# Patient Record
Sex: MALE | HISPANIC OR LATINO | URBAN - METROPOLITAN AREA
[De-identification: names, ages, dates, MRNs, and addresses within clinical notes are randomized per-mention and may not be internally consistent; named-entity substitution may affect disease eponyms.]

---

## 2023-09-05 ENCOUNTER — OFFICE VISIT (OUTPATIENT)
Dept: URGENT CARE | Facility: CLINIC | Age: 18
End: 2023-09-05
Payer: COMMERCIAL

## 2023-09-05 VITALS
BODY MASS INDEX: 19.26 KG/M2 | TEMPERATURE: 98.6 F | OXYGEN SATURATION: 97 % | HEART RATE: 68 BPM | HEIGHT: 69 IN | DIASTOLIC BLOOD PRESSURE: 84 MMHG | WEIGHT: 130 LBS | RESPIRATION RATE: 16 BRPM | SYSTOLIC BLOOD PRESSURE: 122 MMHG

## 2023-09-05 DIAGNOSIS — R21 RASH OF FACE: ICD-10-CM

## 2023-09-05 DIAGNOSIS — L01.00 IMPETIGO: ICD-10-CM

## 2023-09-05 PROCEDURE — 3079F DIAST BP 80-89 MM HG: CPT | Performed by: PHYSICIAN ASSISTANT

## 2023-09-05 PROCEDURE — 99203 OFFICE O/P NEW LOW 30 MIN: CPT | Performed by: PHYSICIAN ASSISTANT

## 2023-09-05 PROCEDURE — 3074F SYST BP LT 130 MM HG: CPT | Performed by: PHYSICIAN ASSISTANT

## 2023-09-05 RX ORDER — CLOTRIMAZOLE 1 %
1 CREAM (GRAM) TOPICAL 2 TIMES DAILY
Qty: 28 G | Refills: 0 | Status: SHIPPED
Start: 2023-09-05 | End: 2023-09-05

## 2023-09-05 RX ORDER — CLOTRIMAZOLE 1 %
1 CREAM (GRAM) TOPICAL 2 TIMES DAILY
Qty: 28 G | Refills: 0 | Status: SHIPPED | OUTPATIENT
Start: 2023-09-05

## 2023-09-05 ASSESSMENT — ENCOUNTER SYMPTOMS
EYES NEGATIVE: 1
CONSTITUTIONAL NEGATIVE: 1

## 2023-09-05 NOTE — LETTER
[facility] URGENT [facility]ARE  [address]  [place] [address]     September 5, 2023    Patient: Ezio Ghotra   YOB: 2005   Date of Visit: 9/5/2023       To Whom It May Concern:    Ezio Ghotra was seen and treated in our department on 9/5/2023.  He will need to remain off work for the next 72 hours due to impetigo infection    Sincerely,     Forest Mares P.A.-C.

## 2023-09-06 ENCOUNTER — TELEPHONE (OUTPATIENT)
Dept: URGENT CARE | Facility: CLINIC | Age: 18
End: 2023-09-06
Payer: COMMERCIAL

## 2023-09-06 NOTE — PROGRESS NOTES
"  Subjective:     Ezio Ghotra  is a 17 y.o. male who presents for Other (Woke up with red spots on face, burning sensation )       He presents today with new onset rash on his face.  He has a rash present in the left nare, right lip as well as multiple rash marks on the right side of the face.  Symptoms have been ongoing over the last few days.  All areas of rash have been oozing a yellow fluid and have honey crusted scabbing over top.  All areas of rash do burn as well.  Denies any recent lifestyle changes.  No new lotions ointments or creams.  No recent travel.  No exposure to any known allergy contacts/contaminants.  Has been using hydrocortisone 1% without any symptom relief.  No fevers, no sinus congestion, no sore throat, no lesions in the mouth.  No ear pain.  No eye drainage or discharge.       Review of Systems   Constitutional: Negative.    HENT: Negative.     Eyes: Negative.    Skin:  Positive for rash.      No Known Allergies  History reviewed. No pertinent past medical history.     Objective:   /84   Pulse 68   Temp 37 °C (98.6 °F) (Temporal)   Resp 16   Ht 1.753 m (5' 9\")   Wt 59 kg (130 lb)   SpO2 97%   BMI 19.20 kg/m²   Physical Exam  Vitals and nursing note reviewed.   Constitutional:       General: He is not in acute distress.     Appearance: He is not ill-appearing or toxic-appearing.   HENT:      Head: Normocephalic.        Comments: Spots of rash present over the above marked regions.  Lesions are circular in nature with lighter colored skin in the middle of the rash with a darker colored border extending away from the middle of the rash.  The lesions on the right side of the lip and in the left nare do have honey crusted appearance at this time.  No active bleeding or drainage from any of the areas     Nose: No rhinorrhea.   Eyes:      General: No scleral icterus.     Conjunctiva/sclera: Conjunctivae normal.   Pulmonary:      Effort: Pulmonary effort is normal. No " respiratory distress.      Breath sounds: No stridor.   Musculoskeletal:      Cervical back: Neck supple.   Neurological:      Mental Status: He is alert and oriented to person, place, and time.   Psychiatric:         Mood and Affect: Mood normal.         Behavior: Behavior normal.         Thought Content: Thought content normal.         Judgment: Judgment normal.             Diagnostic testing: None    Assessment/Plan:     Encounter Diagnoses   Name Primary?    Impetigo     Rash of face           Plan for care for today's complaint includes starting the patient initially on mupirocin and hydrocortisone 2.5% for the rash of the face.  I do have high suspicion of impetigo; however, due to the appearance of the lesions on the right side of the face having a lighter appearance with a darker toned border extending from the center of the lesion it does raise suspicion of a possible fungal infection.  We will have the patient start with the mupirocin and hydrocortisone over the next 4-5 days and if symptoms are not improving at that time then we will have him switch to clotrimazole.  We discussed with the patient that if it is impetigo he is contagious for 72 hours after starting the antibiotic ointment.  Did discuss appropriate hygiene techniques to prevent coinfection or reinfection.  Work note provided continue to monitor symptoms and return to urgent care or follow-up with primary care provider if symptoms remain ongoing.  Follow-up in the emergency department if symptoms become severe, ER precautions discussed in office today..  Prescription for mupirocin, hydrocortisone 2.5%, clotrimazole provided.    See AVS Instructions below for written guidance provided to patient on after-visit management and care in addition to our verbal discussion during the visit.    Please note that this dictation was created using voice recognition software. I have attempted to correct all errors, but there may be sound-alike, spelling,  grammar and possibly content errors that I did not discover before finalizing the note.    Schley Suzan OTTO

## 2023-09-07 NOTE — TELEPHONE ENCOUNTER
Patient had questions in regards to prescriptions prescribed yesterday.    Per note:  Patient start with the mupirocin and hydrocortisone over the next 4-5 days and if symptoms are not improving at that time then we will have him switch to clotrimazole.    Discussed instructions as above with patient.    Advised to return to urgent care or nearest emergency department if symptoms fail to improve, for any change in condition, further concerns, or new concerning symptoms.

## 2023-09-11 ENCOUNTER — TELEPHONE (OUTPATIENT)
Dept: URGENT CARE | Facility: CLINIC | Age: 18
End: 2023-09-11
Payer: COMMERCIAL

## 2023-09-12 DIAGNOSIS — R21 RASH OF FACE: ICD-10-CM

## 2023-09-12 NOTE — TELEPHONE ENCOUNTER
Hi Ulysses,  Patient called today asking to speak to you, he has questions and needs clarification regarding the Rx he is taking.    [phone]

## 2024-11-07 ENCOUNTER — HOSPITAL ENCOUNTER (EMERGENCY)
Facility: MEDICAL CENTER | Age: 19
End: 2024-11-07
Attending: EMERGENCY MEDICINE
Payer: COMMERCIAL

## 2024-11-07 ENCOUNTER — PHARMACY VISIT (OUTPATIENT)
Dept: PHARMACY | Facility: MEDICAL CENTER | Age: 19
End: 2024-11-07
Payer: COMMERCIAL

## 2024-11-07 VITALS
SYSTOLIC BLOOD PRESSURE: 112 MMHG | HEIGHT: 70 IN | RESPIRATION RATE: 17 BRPM | WEIGHT: 136.02 LBS | OXYGEN SATURATION: 95 % | BODY MASS INDEX: 19.47 KG/M2 | HEART RATE: 71 BPM | DIASTOLIC BLOOD PRESSURE: 57 MMHG | TEMPERATURE: 98.9 F

## 2024-11-07 DIAGNOSIS — M62.838 MUSCLE SPASM: ICD-10-CM

## 2024-11-07 DIAGNOSIS — S39.92XA INJURY OF BACK, INITIAL ENCOUNTER: ICD-10-CM

## 2024-11-07 PROCEDURE — 700102 HCHG RX REV CODE 250 W/ 637 OVERRIDE(OP): Performed by: EMERGENCY MEDICINE

## 2024-11-07 PROCEDURE — RXMED WILLOW AMBULATORY MEDICATION CHARGE: Performed by: EMERGENCY MEDICINE

## 2024-11-07 PROCEDURE — 96372 THER/PROPH/DIAG INJ SC/IM: CPT

## 2024-11-07 PROCEDURE — A9270 NON-COVERED ITEM OR SERVICE: HCPCS | Performed by: EMERGENCY MEDICINE

## 2024-11-07 PROCEDURE — 700111 HCHG RX REV CODE 636 W/ 250 OVERRIDE (IP): Mod: JZ | Performed by: EMERGENCY MEDICINE

## 2024-11-07 PROCEDURE — 99283 EMERGENCY DEPT VISIT LOW MDM: CPT

## 2024-11-07 RX ORDER — KETOROLAC TROMETHAMINE 15 MG/ML
15 INJECTION, SOLUTION INTRAMUSCULAR; INTRAVENOUS ONCE
Status: DISCONTINUED | OUTPATIENT
Start: 2024-11-07 | End: 2024-11-07

## 2024-11-07 RX ORDER — HYDROCODONE BITARTRATE AND ACETAMINOPHEN 5; 325 MG/1; MG/1
1 TABLET ORAL EVERY 6 HOURS PRN
Qty: 12 TABLET | Refills: 0 | Status: SHIPPED | OUTPATIENT
Start: 2024-11-07 | End: 2024-11-10

## 2024-11-07 RX ORDER — KETOROLAC TROMETHAMINE 15 MG/ML
15 INJECTION, SOLUTION INTRAMUSCULAR; INTRAVENOUS ONCE
Status: COMPLETED | OUTPATIENT
Start: 2024-11-07 | End: 2024-11-07

## 2024-11-07 RX ORDER — HYDROCODONE BITARTRATE AND ACETAMINOPHEN 5; 325 MG/1; MG/1
1 TABLET ORAL ONCE
Status: COMPLETED | OUTPATIENT
Start: 2024-11-07 | End: 2024-11-07

## 2024-11-07 RX ADMIN — KETOROLAC TROMETHAMINE 15 MG: 15 INJECTION, SOLUTION INTRAMUSCULAR; INTRAVENOUS at 16:26

## 2024-11-07 RX ADMIN — HYDROCODONE BITARTRATE AND ACETAMINOPHEN 1 TABLET: 5; 325 TABLET ORAL at 16:26

## 2024-11-07 NOTE — LETTER
"    EMPLOYEE’S CLAIM FOR COMPENSATION/ REPORT OF INITIAL TREATMENT  FORM C-4  PLEASE TYPE OR PRINT    EMPLOYEE’S CLAIM - PROVIDE ALL INFORMATION REQUESTED   First Name                    CIARRA Holguin                  Last Name  Stella Ghotra Birthdate                    2005                Sex  Male Claim Number (Insurer’s Use Only)     Home Address  [address] Age  18 y.o. 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 (if applicable)   GETTING ORDERS READY TO BE DELIVERED    How did this injury or occupational disease occur? (Be specific and answer in detail. Use additional sheet if necessary)  CARRYING HEAVY CRATES WITH GROCERIES, MILKS,WATER CASES, ECT. MOVING FAST, BENDING DOWN, LIFTING    If you believe that you have an occupational disease, when did you first have knowledge of the disability and its relationship to your employment?  N/A Witnesses to the Accident (if applicable)     SUPERVISOR   Nature of Injury or Occupational Disease   BACK / LUNGS " Part(s) of Body Injured or Affected    ENTIRE BACK, CAN'T BREATH    I CERTIFY THAT THE ABOVE IS TRUE AND CORRECT TO T HE BEST OF MY KNOWLEDGE AND THAT I HAVE PROVIDED THIS INFORMATION IN ORDER TO OBTAIN THE BENEFITS OF [place], OR CHAPTER 617 OF [place]RS).  I HEREBY AUTHORIZE ANY PHYSICIAN, CHIROPRACTOR, SURGEON, PRACTITIONER OR ANY OTHER PERSON, ANY HOSPITAL, INCLUDING [facility] OR [facility], ANY  MEDICAL SERVICE ORGANIZATION, ANY INSURANCE COMPANY, OR OTHER INSTITUTION OR ORGANIZATION TO RELEASE TO EACH OTHER, ANY MEDICAL OR OTHER INFORMATION, INCLUDING BENEFITS PAID OR PAYABLE, PERTINENT TO THIS INJURY OR DISEASE, EXCEPT INFORMATION RELATIVE TO DIAGNOSIS, TREATMENT AND/OR COUNSELING FOR AIDS, PSYCHOLOGICAL CONDITIONS, ALCOHOL OR CONTROLLED SUBSTANCES, FOR WHICH I MUST GIVE SPECIFIC AUTHORIZATION.  A PHOTOSTAT OF THIS AUTHORIZATION SHALL BE VALID AS THE ORIGINAL.     Date 11-07-24   Place  [facility] Employee’s Original or    *Electronic Signature   THIS REPORT MUST BE COMPLETED AND MAILED WITHIN 3 WORKING DAYS OF TREATMENT   Place  [facility], EMERGENCY DEPT    Name of Facility   ED [facility]   Date 11/7/2024 Diagnosis and Description of Injury or Occupational Disease  No diagnosis found.  There were no encounter diagnoses. Is there evidence that the injured employee was under the influence of alcohol and/or another controlled substance at the time of accident?  []No  [] Yes (if yes, please explain)   Hour       Treatment:      Have you advised the patient to remain off work five days or more?   [] Yes Indicate dates: From   To    [] No      If no, is the injured employee capable of: [] full duty [] modified duty                     If modified duty, specify any limitations / restrictions:                                                                                                                                                                                                                                                                                                                                                                                                                   X-Ray Findings:      From information given by the employee, together with medical evidence, can you directly connect this injury or occupational disease as job incurred?  []Yes   [] No      Is additional medical care by a physician indicated? []Yes [] No       Do you know of any previous injury or disease contributing to this condition or occupational disease? []Yes [] No (Explain if yes)                              Date  11/7/2024 Print Health Care Provider’s Name  FREEDOM HEIN M.D I certify that the employer’s copy of  this form was delivered to the employer on:   Address [address]    INSURER'S USE ONLY                       [place] [facility]     [address] Provider’s Tax ID Number   [id number]   Telephone  Dept: [phone]    Health Care Provider’s Original or Electronic Signature    Degree (MD,DO, DC,PA-C,APRN)  MD  Choose (if applicable)      ORIGINAL - TREATING HEALTHCARE PROVIDER PAGE 2 - INSURER/TPA PAGE 3 - EMPLOYER PAGE 4 - EMPLOYEE             Form C-4 (rev.08/23)

## 2024-11-07 NOTE — ED TRIAGE NOTES
Chief Complaint   Patient presents with    Back Pain     Ambulatory to triage w/ c/o mid to lower back pain after doing heavy lifting at work today.

## 2024-11-07 NOTE — LETTER
"    EMPLOYEE’S CLAIM FOR COMPENSATION/ REPORT OF INITIAL TREATMENT  FORM C-4  PLEASE TYPE OR PRINT    EMPLOYEE’S CLAIM - PROVIDE ALL INFORMATION REQUESTED   First Name                    CIARRA Holguin                  Last Name  Stella Ghotra Birthdate                    2005                Sex  Male Claim Number (Insurer’s Use Only)     Home Address  [address] Age  18 y.o. 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 (if applicable)  GETTING ORDERS READY TO BE DELIVERED    How did this injury or occupational disease occur? (Be specific and answer in detail. Use additional sheet if necessary)  CARRYING HEAVY CRATES WITH GROCIERIES, MILK, WATER CASES ECT. MOVING FAST, BENDING DOWN, LIFTING.   If you believe that you have an occupational disease, when did you first have knowledge of the disability and its relationship to your employment?  NONE Witnesses to the Accident (if applicable)  [employer]      Nature of Injury or Occupational Disease  Strain  " Part(s) of Body Injured or Affected  Lower Back Area (Lumbar Area & Lumbo-Sacral) Lungs N/A    I CERTIFY THAT THE ABOVE IS TRUE AND CORRECT TO T HE BEST OF MY KNOWLEDGE AND THAT I HAVE PROVIDED THIS INFORMATION IN ORDER TO OBTAIN THE BENEFITS OF [place] AND [place], [place], OR CHAPTER 617 OF [place]).  I HEREBY AUTHORIZE ANY PHYSICIAN, CHIROPRACTOR, SURGEON, PRACTITIONER OR ANY OTHER PERSON, ANY HOSPITAL, INCLUDING [facility] OR [facility], ANY  MEDICAL SERVICE ORGANIZATION, ANY INSURANCE COMPANY, OR OTHER INSTITUTION OR ORGANIZATION TO RELEASE TO EACH OTHER, ANY MEDICAL OR OTHER INFORMATION, INCLUDING BENEFITS PAID OR PAYABLE, PERTINENT TO THIS INJURY OR DISEASE, EXCEPT INFORMATION RELATIVE TO DIAGNOSIS, TREATMENT AND/OR COUNSELING FOR AIDS, PSYCHOLOGICAL CONDITIONS, ALCOHOL OR CONTROLLED SUBSTANCES, FOR WHICH I MUST GIVE SPECIFIC AUTHORIZATION.  A PHOTOSTAT OF THIS AUTHORIZATION SHALL BE VALID AS THE ORIGINAL.     Date   Place [facility] Employee’s Original or  *Electronic Signature   THIS REPORT MUST BE COMPLETED AND MAILED WITHIN 3 WORKING DAYS OF TREATMENT   Place [facility],  EMERGENCY DEPT    Name of Facility EMERGENCY DEPT    Date 11/7/2024 Diagnosis and Description of Injury or Occupational Disease  (S39.92XA) Injury of back, initial encounter  (M62.838) Muscle spasm  Diagnoses of Injury of back, initial encounter and Muscle spasm were pertinent to this visit. Is there evidence that the injured employee was under the influence of alcohol and/or another controlled substance at the time of accident?  [x]No  [] Yes (if yes, please explain)   Hour 1826  No   Treatment: Analgesia, follow-up.    Have you advised the patient to remain off work five days or more?   [] Yes Indicate dates: From   To    [] No      If no, is the injured employee capable of: [] full duty [] modified duty                     If modified duty,  specify any limitations / restrictions:  No lifting until cleared by occupational health.                                                                                                                                                                                                                                                                                                                                                                                                               X-Ray Findings:      From information given by the employee, together with medical evidence, can you directly connect this injury or occupational disease as job incurred?  [x]Yes   [] No Yes    Is additional medical care by a physician indicated? [x]Yes [] No  Yes    Do you know of any previous injury or disease contributing to this condition or occupational disease? []Yes [x] No (Explain if yes)                          No   Date  11/8/2024 Print Health Care Provider’s Name  FREEDOM HEIN  I certify that the employer’s copy of  this form was delivered to the employer on:   Address [address]  INSURER'S USE ONLY                       [place] Zip   [address] Provider’s Tax ID Number [id number]      Telephone  Dept: [phone]    Health Care Provider’s Original or Electronic Signature  e-FREEDOM Jimenez M.D. Degree (MD,DO, DC,PA-C,APRN)  MD        ORIGINAL - TREATING HEALTHCARE PROVIDER PAGE 2 - INSURER/TPA PAGE 3 - EMPLOYER PAGE 4 - EMPLOYEE             Form C-4 (rev.08/23)

## 2024-11-07 NOTE — LETTER
"    EMPLOYEE’S CLAIM FOR COMPENSATION/ REPORT OF INITIAL TREATMENT  FORM C-4  PLEASE TYPE OR PRINT    EMPLOYEE’S CLAIM - PROVIDE ALL INFORMATION REQUESTED   First Name                    CIARRA Holguin                  Last Name  Stella Ghotra Birthdate                    2005                Sex  Male Claim Number (Insurer’s Use Only)     Home Address  [address] Age  18 y.o. Height  1.778 m (5' 10\") Weight  61.7 kg (136 lb 0.4 oz) Social Security Number  [id number]   [facility] Zip  [address] Telephone  [phone] (home)    Mailing Address  [address] [facility] Zip  [address] Primary Language Spoken  English    INSURER  *** THIRD-PARTY   [employer]   Employee's Occupation (Job Title) When Injury or Occupational Disease Occurred      Employer's Name/Company Name     Telephone      Office Mail Address (Number and Street)       Date of Injury (if applicable)                Hours Injury (if applicable)   Date Employer Notified   Last Day of Work after Injury or Occupational Disease   Supervisor to Whom Injury Reported     Address or Location of Accident (if applicable)     What were you doing at the time of accident? (if applicable)      How did this injury or occupational disease occur? (Be specific and answer in detail. Use additional sheet if necessary)     If you believe that you have an occupational disease, when did you first have knowledge of the disability and its relationship to your employment?   Witnesses to the Accident (if applicable)        Nature of Injury or Occupational Disease    Part(s) of Body Injured or Affected        I CERTIFY THAT THE ABOVE IS TRUE AND CORRECT TO T HE BEST OF MY KNOWLEDGE AND THAT I HAVE PROVIDED THIS INFORMATION IN ORDER TO OBTAIN THE BENEFITS OF [place] AND [place], [place], OR " CHAPTER 617 OF [place]).  I HEREBY AUTHORIZE ANY PHYSICIAN, CHIROPRACTOR, SURGEON, PRACTITIONER OR ANY OTHER PERSON, ANY HOSPITAL, INCLUDING [facility] OR [facility], ANY  MEDICAL SERVICE ORGANIZATION, ANY INSURANCE COMPANY, OR OTHER INSTITUTION OR ORGANIZATION TO RELEASE TO EACH OTHER, ANY MEDICAL OR OTHER INFORMATION, INCLUDING BENEFITS PAID OR PAYABLE, PERTINENT TO THIS INJURY OR DISEASE, EXCEPT INFORMATION RELATIVE TO DIAGNOSIS, TREATMENT AND/OR COUNSELING FOR AIDS, PSYCHOLOGICAL CONDITIONS, ALCOHOL OR CONTROLLED SUBSTANCES, FOR WHICH I MUST GIVE SPECIFIC AUTHORIZATION.  A PHOTOSTAT OF THIS AUTHORIZATION SHALL BE VALID AS THE ORIGINAL.     Date   Place Employee’s Original or  *Electronic Signature   THIS REPORT MUST BE COMPLETED AND MAILED WITHIN 3 WORKING DAYS OF TREATMENT   Place  [facility], EMERGENCY DEPT    Name of Facility      Date 11/7/2024 Diagnosis and Description of Injury or Occupational Disease  No diagnosis found.  There were no encounter diagnoses. Is there evidence that the injured employee was under the influence of alcohol and/or another controlled substance at the time of accident?  []No  [] Yes (if yes, please explain)   Hour       Treatment:      Have you advised the patient to remain off work five days or more?   [] Yes Indicate dates: From   To    [] No      If no, is the injured employee capable of: [] full duty [] modified duty                     If modified duty, specify any limitations / restrictions:                                                                                                                                                                                                                                                                                                                                                                                                                  X-Ray Findings:      From information given by the  employee, together with medical evidence, can you directly connect this injury or occupational disease as job incurred?  []Yes   [] No      Is additional medical care by a physician indicated? []Yes [] No       Do you know of any previous injury or disease contributing to this condition or occupational disease? []Yes [] No (Explain if yes)                              Date  11/7/2024 Print Health Care Provider’s Name  No name on file I certify that the employer’s copy of  this form was delivered to the employer on:   Address    INSURER'S USE ONLY                       City    State    Zip    Provider’s Tax ID Number      Telephone  Dept: [phone]    Health Care Provider’s Original or Electronic Signature    Degree (MD,DO, DC,PAKassandraC,APRN)  {Provider Degrees:[id number]}  Choose (if applicable)      ORIGINAL - TREATING HEALTHCARE PROVIDER PAGE 2 - INSURER/TPA PAGE 3 - EMPLOYER PAGE 4 - EMPLOYEE             Form C-4 (rev.08/23)

## 2024-11-08 ENCOUNTER — OCCUPATIONAL MEDICINE (OUTPATIENT)
Dept: URGENT CARE | Facility: CLINIC | Age: 19
End: 2024-11-08
Payer: COMMERCIAL

## 2024-11-08 VITALS
TEMPERATURE: 99 F | HEIGHT: 70 IN | RESPIRATION RATE: 16 BRPM | OXYGEN SATURATION: 97 % | HEART RATE: 66 BPM | WEIGHT: 136 LBS | BODY MASS INDEX: 19.47 KG/M2 | DIASTOLIC BLOOD PRESSURE: 66 MMHG | SYSTOLIC BLOOD PRESSURE: 100 MMHG

## 2024-11-08 DIAGNOSIS — S39.012D BACK STRAIN, SUBSEQUENT ENCOUNTER: ICD-10-CM

## 2024-11-08 PROCEDURE — 99213 OFFICE O/P EST LOW 20 MIN: CPT

## 2024-11-08 PROCEDURE — 3078F DIAST BP <80 MM HG: CPT

## 2024-11-08 PROCEDURE — 3074F SYST BP LT 130 MM HG: CPT

## 2024-11-08 NOTE — LETTER
PHYSICIAN’S AND CHIROPRACTIC PHYSICIAN'S   PROGRESS REPORT   CERTIFICATION OF DISABILITY Claim Number:     Social Security Number:    Patient’s Name: PAUL HO Date of Injury: 11/7/2024   Employer: [employer] [id number] Name of MCO (if applicable):      Patient’s Job Description/Occupation: ONLINE  AND DELIVERY       Previous Injuries/Diseases/Surgeries Contributing to the Condition:         Diagnosis: (S39.012D) Back strain, subsequent encounter      Related to the Industrial Injury? Yes     Explain: The patient presents for evaluation of a work-related injury.  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 The incident involved lifting heavy groceries, including cases of water and milk, during which he twisted his back. He sought emergency care last night and was advised to follow up today.    He has been managing the pain with hydrocodone. He reports no prior history of back injuries. Currently, he is not experiencing any pain and can perform basic range of motion exercises. He reports no weakness in his hands or feet, and there are no reports of numbness or tingling.         ROS per HPI      Objective Medical Findings: Physical Exam  Vitals and nursing note reviewed.   Constitutional:       General: He is not in acute distress.     Appearance: Normal appearance. He is normal weight. He is not ill-appearing, toxic-appearing or diaphoretic.   HENT:      Head: Normocephalic and atraumatic.   Pulmonary:      Effort: Pulmonary effort is normal.   Chest:      Chest wall: No tenderness.   Musculoskeletal:      Lumbar back: No swelling, edema, deformity, signs of trauma, lacerations, spasms, tenderness or bony tenderness. Normal range of motion. No scoliosis.   Skin:     General: Skin is warm and dry.      Capillary Refill: Capillary refill takes less than 2 seconds.   Neurological:      General: No focal  deficit present.      Mental Status: He is alert and oriented to person, place, and time. Mental status is at baseline.      Gait: Gait normal.   Psychiatric:         Mood and Affect: Mood normal.         Behavior: Behavior normal.         Thought Content: Thought content normal.         Judgment: Judgment normal.              None - Discharged                         Stable  Yes                 Ratable  No     X   Generally Improved                         Condition Worsened                  Condition Same  May Have Suffered a Permanent Disability No     Treatment Plan:    Tylenol, ibuprofen, stretching          No Change in Therapy                  PT/OT Prescribed                      Medication May be Used While Working        Case Management                          PT/OT Discontinued    Consultation    Further Diagnostic Studies:    Prescription(s)                 Released to FULL DUTY /No Restrictions on (Date):       Certified TOTALLY TEMPORARILY DISABLED (Indicate Dates) From:   To:    X  Released to RESTRICTED/Modified Duty on (Date): From: 11/8/2024 To: 11/14/2024  Restrictions Are:         No Sitting    No Standing X   No Pulling Other:         No Bending at Waist     No Stooping X    No Lifting        No Carrying     No Walking Lifting Restricted to (lbs.):  < or = to 10 pounds    X   No Pushing        No Climbing     No Reaching Above Shoulders       Date of Next Visit:  11/14/2024 Date of this Exam: 11/8/2024 Physician/Chiropractic Physician Name: GENE Thomson Physician/Chiropractic Physician Signature:  Glynn Ford DO [facility]:  [address] [place] [address], [address] [place], [place] [address] - Telephone [phone] [place]:  [address] [place] [address], [address] [place], [place] [address] - Telephone [phone]    [email or web address]  D-39 (Rev. 10/24)

## 2024-11-08 NOTE — LETTER
"    EMPLOYEE’S CLAIM FOR COMPENSATION/ REPORT OF INITIAL TREATMENT  FORM C-4  PLEASE TYPE OR PRINT    EMPLOYEE’S CLAIM - PROVIDE ALL INFORMATION REQUESTED   First Name                    CIARRA Holguin                  Last Name  Stella Ghotra Birthdate                    2005                Sex  Male Claim Number (Insurer’s Use Only)     Home Address  [address] Age  18 y.o. 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 (if applicable)  GETTING ORDERS READY TO BE DELIVERED    How did this injury or occupational disease occur? (Be specific and answer in detail. Use additional sheet if necessary)  CARRYING HEAVY CRATES WITH GROCIERIES, MILK, WATER CASES ECT. MOVING FAST, BENDING DOWN, LIFTING.   If you believe that you have an occupational disease, when did you first have knowledge of the disability and its relationship to your employment?  NONE Witnesses to the Accident (if applicable)  [employer]      Nature of Injury or Occupational Disease  Strain  Part(s) of " Body Injured or Affected  Lower Back Area (Lumbar Area & Lumbo-Sacral) Lungs N/A    I CERTIFY THAT THE ABOVE IS TRUE AND CORRECT TO T HE BEST OF MY KNOWLEDGE AND THAT I HAVE PROVIDED THIS INFORMATION IN ORDER TO OBTAIN THE BENEFITS OF [place]CC[place], OR CHAPTER 617 OF [place]RS).  I HEREBY AUTHORIZE ANY PHYSICIAN, CHIROPRACTOR, SURGEON, PRACTITIONER OR ANY OTHER PERSON, ANY HOSPITAL, INCLUDING [facility] OR [facility], ANY  MEDICAL SERVICE ORGANIZATION, ANY INSURANCE COMPANY, OR OTHER INSTITUTION OR ORGANIZATION TO RELEASE TO EACH OTHER, ANY MEDICAL OR OTHER INFORMATION, INCLUDING BENEFITS PAID OR PAYABLE, PERTINENT TO THIS INJURY OR DISEASE, EXCEPT INFORMATION RELATIVE TO DIAGNOSIS, TREATMENT AND/OR COUNSELING FOR AIDS, PSYCHOLOGICAL CONDITIONS, ALCOHOL OR CONTROLLED SUBSTANCES, FOR WHICH I MUST GIVE SPECIFIC AUTHORIZATION.  A PHOTOSTAT OF THIS AUTHORIZATION SHALL BE VALID AS THE ORIGINAL.     Date   Place Employee’s Original or  *Electronic Signature   THIS REPORT MUST BE COMPLETED AND MAILED WITHIN 3 WORKING DAYS OF TREATMENT   Place  [facility]    Name of Facility  [facility]   Date 11/8/2024 Diagnosis and Description of Injury or Occupational Disease  (S39.012D) Back strain, subsequent encounter  The encounter diagnosis was Back strain, subsequent encounter. Is there evidence that the injured employee was under the influence of alcohol and/or another controlled substance at the time of accident?  []No  [] Yes (if yes, please explain)   Hour 12:28 PM  No   Treatment: Hydrocodone as prescribed by ED not to be taken while working, recommended ibuprofen     Have you advised the patient to remain off work five days or more?   [] Yes Indicate dates: From   To    [] No      If no, is the injured employee capable of: [] full duty [] modified duty                     If modified duty, specify any limitations /  restrictions:  weight restriction of 10 pounds has been imposed, prohibiting him from lifting, pulling, or pushing anything heavier.                                                                                                                                                                                                                                                                                                                                                                                                                X-Ray Findings:   Comments:n/a    From information given by the employee, together with medical evidence, can you directly connect this injury or occupational disease as job incurred?  []Yes   [] No Yes    Is additional medical care by a physician indicated? []Yes [] No  Yes    Do you know of any previous injury or disease contributing to this condition or occupational disease? []Yes [] No (Explain if yes)                          No   Date  11/8/2024 Print Health Care Provider’s Name  GENE Thomson I certify that the employer’s copy of  this form was delivered to the employer on:   Address  [address] [place] [address] INSURER'S USE ONLY                       [facility]  [id number] Provider’s Tax ID Number  [id number]   Telephone  Dept: [phone]    Health Care Provider’s Original or Electronic Signature  e-SignCARTERGABRIELA Degree (MD,DO, DC,PA-C,APRN)  APRN  Choose (if applicable)      ORIGINAL - TREATING HEALTHCARE PROVIDER PAGE 2 - INSURER/TPA PAGE 3 - EMPLOYER PAGE 4 - EMPLOYEE             Form C-4 (rev.08/23)

## 2024-11-08 NOTE — DISCHARGE INSTRUCTIONS
Rest, take ibuprofen for pain.  Take Norco for breakthrough pain.  Return for worsening pain or other concerns.  Follow-up with occupational health this week.  No driving on Norco.

## 2024-11-08 NOTE — ED PROVIDER NOTES
"ED Provider Note    CHIEF COMPLAINT  Chief Complaint   Patient presents with    Back Pain       EXTERNAL RECORDS REVIEWED   is reviewed.    HPI/ROS  LIMITATION TO HISTORY   Select: : None  OUTSIDE HISTORIAN(S):  None.    Ezio Ghotra is a 18 y.o. male who presents to the emergency department for evaluation of low back pain.  The patient was working in moving multiple heavy crates and twisting and turning and developed back pain.  Started mild but became fairly severe over the course of the few minutes.  Is diffuse throughout his back.  Mostly in the lower thoracic upper lumbar region.  Makes him lean to the left a little bit.  Pain is worsened when he sits up or moves or when he takes a deep breath.  No falls or direct trauma.  No numbness or tingling in the legs.  No bowel or bladder symptoms.  No fevers chills or IV drug abuse.  No history of back pain.    PAST MEDICAL HISTORY       SURGICAL HISTORY  patient denies any surgical history    FAMILY HISTORY  History reviewed. No pertinent family history.    SOCIAL HISTORY  Social History     Tobacco Use    Smoking status: Never    Smokeless tobacco: Not on file   Substance and Sexual Activity    Alcohol use: Never    Drug use: Never    Sexual activity: Not on file       CURRENT MEDICATIONS  Home Medications    **Home medications have not yet been reviewed for this encounter**         ALLERGIES  No Known Allergies    PHYSICAL EXAM  VITAL SIGNS: /57   Pulse 71   Temp 37.3 °C (99.1 °F) (Temporal)   Resp 17   Ht 1.778 m (5' 10\")   Wt 61.7 kg (136 lb 0.4 oz)   SpO2 95%   BMI 19.52 kg/m²    Constitutional: Well developed, Well nourished, No acute distress, Non-toxic appearance.   HENT: Normocephalic, Atraumatic,  Eyes: PERRL, EOMI, Conjunctiva normal, No discharge.   Neck: Normal range of motion,  Cardiovascular: Normal heart rate, Normal rhythm, No murmurs, No rubs, No gallops.   Thorax & Lungs: Normal breath sounds, No respiratory distress, No " wheezing, No chest tenderness.   Abdomen: Soft, No tenderness  Skin: Warm, Dry, No erythema, No rash.   Back: No midline tenderness in the thoracic or lumbar spine.  The significant paraspinal muscle spasm tenderness of the right side of thoracic and lumbar spine.    Musculoskeletal: Good range of motion in all major joints.  No edema good pulses.  Neurologic: Alert, No focal deficits noted.  Normal strength in both lower extremities including great toe raise plantarflexion and dorsiflexion.  Normal strength at the knees.  Normal sensation normal DTRs in ankle and patella.  Psychiatric: Affect normal      EKG/LABS  No labs indicated.  I have independently interpreted this EKG    RADIOLOGY/PROCEDURES   No imaging indicated.      COURSE & MEDICAL DECISION MAKING    ASSESSMENT, COURSE AND PLAN  Care Narrative:   18-year-old male presents the Emergency Department with acute back pain in the mid and low back.  This is atraumatic low back pain associate with working and lifting.  Presents like musculoskeletal pain and strain.    He has a normal neurologic examination.  No signs of sciatica or nerve root compression or spinal cord compression.  There is no red flags for abscess or tumor.  The patient has pain when takes a deep breath but his lungs are clear he is not hypoxemic I think it is related to his muscles in his back.  Patient is PERC negative and does not really seem like he has a PE.  Seems like it is musculoskeletal back pain.      The patient was given Toradol shot and a [place] and he feels much better.  He is able to take a deep breath or move.  Repeat lung exam is normal.    At this point this is musculoskeletal back pain from lifting.  The plan will be rest, prescribe a short course of Norco as well as recommend ibuprofen and follow-up with occupational health.  Return the interim for pain for any numbness weakness or other concerns.  Questions were answered.  He is agreeable to plan.      [facility]  [place] [address] [facility]  [address]  [place] [address]  [phone]  Schedule an appointment as soon as possible for a visit in 1 day                DISPOSITION AND DISCUSSIONS      Escalation of care considered, and ultimately not performed: Consider getting imaging but he has no trauma associate with his back pain.        FINAL DIAGNOSIS  1. Injury of back, initial encounter    2. Muscle spasm      Addendum    In prescribing controlled substances to this patient, I certify that I have obtained and reviewed the medical history of Ezio Ghotra. I have also made a good irma effort to obtain applicable records from other providers who have treated the patient and records did not demonstrate any increased risk of substance abuse that would prevent me from prescribing controlled substances.     I have conducted a physical exam and documented it. I have reviewed Mr. Stella Ghotra’s prescription history as maintained by the [place] Prescription Monitoring Program.     I have assessed the patient’s risk for abuse, dependency, and addiction using the validated Opioid Risk Tool available at https://www.mdcalc.com/eksspd-jsdv-qcyg-ort-narcotic-abuse.     Given the above, I believe the benefits of controlled substance therapy outweigh the risks. The reasons for prescribing controlled substances include non-narcotic, oral analgesic alternatives have been inadequate for pain control. Accordingly, I have discussed the risk and benefits, treatment plan, and alternative therapies with the patient.         Electronically signed by: Magdaleno Bustillo M.D., 11/7/2024 5:30 PM

## 2024-11-08 NOTE — PROGRESS NOTES
"Verbal consent was acquired by the patient to use [employer] ambient listening note generation during this visit   Subjective:   Ezio Ghotra is a 18 y.o. male who presents for Injury (WC F/U: Feeling better, still on medication for it, pain comes and goes. )      HPI:  History of Present Illness  The patient presents for evaluation of a work-related injury.  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 The incident involved lifting heavy groceries, including cases of water and milk, during which he twisted his back. He sought emergency care last night and was advised to follow up today.    He has been managing the pain with hydrocodone. He reports no prior history of back injuries. Currently, he is not experiencing any pain and can perform basic range of motion exercises. He reports no weakness in his hands or feet, and there are no reports of numbness or tingling.       ROS per HPI    Problem list, medications, and allergies reviewed by myself today in Epic.     Objective:     /66   Pulse 66   Temp 37.2 °C (99 °F)   Resp 16   Ht 1.778 m (5' 10\")   Wt 61.7 kg (136 lb)   SpO2 97%   BMI 19.51 kg/m²     Physical Exam  Vitals and nursing note reviewed.   Constitutional:       General: He is not in acute distress.     Appearance: Normal appearance. He is normal weight. He is not ill-appearing, toxic-appearing or diaphoretic.   HENT:      Head: Normocephalic and atraumatic.   Pulmonary:      Effort: Pulmonary effort is normal.   Chest:      Chest wall: No tenderness.   Musculoskeletal:      Lumbar back: No swelling, edema, deformity, signs of trauma, lacerations, spasms, tenderness or bony tenderness. Normal range of motion. No scoliosis.   Skin:     General: Skin is warm and dry.      Capillary Refill: Capillary refill takes less than 2 seconds.   Neurological:      General: No focal deficit present.      Mental Status: " He is alert and oriented to person, place, and time. Mental status is at baseline.      Gait: Gait normal.   Psychiatric:         Mood and Affect: Mood normal.         Behavior: Behavior normal.         Thought Content: Thought content normal.         Judgment: Judgment normal.         Assessment/Plan:     Diagnosis and associated orders:   1. Back strain, subsequent encounter               Comments/MDM:   Pt is clinically stable at today's acute urgent care visit.  No acute distress noted. Appropriate for outpatient management at this time.     Assessment & Plan    A weight restriction of 10 pounds has been imposed, prohibiting him from lifting, pulling, or pushing anything heavier. He is advised to continue his pain medication, hydrocodone, but not while at work. Ibuprofen can be taken as an alternative. Heat and ice application, along with gentle stretches, are recommended for relief.    Follow-up  Return in 4-5 days for reevaluation.            Discussed DDx, management options (risks,benefits, and alternatives to planned treatment), natural progression and supportive care.  Expressed understanding and the treatment plan was agreed upon. Questions were encouraged and answered   Return to urgent care prn if new or worsening sx or if there is no improvement in condition prn.    Educated in Red flags and indications to immediately call 911 or present to the Emergency Department.   Advised the patient to follow-up with the primary care physician for recheck, reevaluation, and consideration of further management.    I personally reviewed prior external notes and test results pertinent to today's visit.  I have independently reviewed and interpreted all diagnostics ordered during this urgent care acute visit.       Please note that this dictation was created using voice recognition software. I have made a reasonable attempt to correct obvious errors, but I expect that there are errors of grammar and possibly content  that I did not discover before finalizing the note.    This note was electronically signed by MORENITA Clarke

## 2024-11-14 ENCOUNTER — OCCUPATIONAL MEDICINE (OUTPATIENT)
Dept: URGENT CARE | Facility: CLINIC | Age: 19
End: 2024-11-14
Payer: COMMERCIAL

## 2024-11-14 VITALS
RESPIRATION RATE: 16 BRPM | WEIGHT: 139.3 LBS | TEMPERATURE: 97.8 F | HEIGHT: 70 IN | HEART RATE: 86 BPM | DIASTOLIC BLOOD PRESSURE: 54 MMHG | OXYGEN SATURATION: 98 % | BODY MASS INDEX: 19.94 KG/M2 | SYSTOLIC BLOOD PRESSURE: 120 MMHG

## 2024-11-14 DIAGNOSIS — S39.012D BACK STRAIN, SUBSEQUENT ENCOUNTER: ICD-10-CM

## 2024-11-14 PROCEDURE — 3078F DIAST BP <80 MM HG: CPT | Performed by: NURSE PRACTITIONER

## 2024-11-14 PROCEDURE — 99213 OFFICE O/P EST LOW 20 MIN: CPT | Performed by: NURSE PRACTITIONER

## 2024-11-14 PROCEDURE — 3074F SYST BP LT 130 MM HG: CPT | Performed by: NURSE PRACTITIONER
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
## 2024-11-14 NOTE — LETTER
PHYSICIAN’S AND CHIROPRACTIC PHYSICIAN'S   PROGRESS REPORT   CERTIFICATION OF DISABILITY Claim Number:     Social Security Number:    Patient’s Name: PAUL HO Date of Injury: 11/7/2024   Employer: [employer] [place] [address] Name of MCO (if applicable):      Patient’s Job Description/Occupation: ONLINE  AND DELIVERY       Previous Injuries/Diseases/Surgeries Contributing to the Condition:  Denies      Diagnosis: (S34.228D) Back strain, subsequent encounter      Related to the Industrial Injury? Yes     Explain: Lifting      Objective Medical Findings: A/Ox4. NAD. Thoracic spine: No midline tenderness, step-off, deformity, no spasms.  Muscular tenderness noted to bilateral paraspinal regions.  Negative straight leg raise.  Skin without erythema, edema, or ecchymosis.  Gait normal. +AROM.           None - Discharged                         Stable  Yes                 Ratable  Yes     X   Generally Improved                         Condition Worsened                  Condition Same  May Have Suffered a Permanent Disability No     Treatment Plan:    OTC NSAIDs, ice and heat therapy, work restrictions, return to clinic in 4 days for reevaluation.         No Change in Therapy                  PT/OT Prescribed                      Medication May be Used While Working        Case Management                          PT/OT Discontinued    Consultation    Further Diagnostic Studies:    Prescription(s)                 Released to FULL DUTY /No Restrictions on (Date):       Certified TOTALLY TEMPORARILY DISABLED (Indicate Dates) From:   To:    X  Released to RESTRICTED/Modified Duty on (Date): From: 11/14/2024 To: 11/18/2024  Restrictions Are:  Temporary      No Sitting    No Standing X   No Pulling Other:         No Bending at Waist     No Stooping X    No Lifting    X    No Carrying     No Walking Lifting Restricted to (lbs.):  < or = to 10 pounds    X   No Pushing        No Climbing     No Reaching  Above Shoulders       Date of Next Visit:    Date of this Exam: 11/14/2024 Physician/Chiropractic Physician Name: GENE Mojica Physician/Chiropractic Physician Signature:  Glynn Ford DO MPH     [place]:  [facility]  [facility], [address] [place], [place] [address] - Telephone [phone] [place]:  [address] [place] [address], [address] [place], [place] [address] - Telephone [phone]    [email or web address]  D-39 (Rev. 10/24)

## 2024-11-18 ENCOUNTER — OCCUPATIONAL MEDICINE (OUTPATIENT)
Dept: URGENT CARE | Facility: CLINIC | Age: 19
End: 2024-11-18
Payer: COMMERCIAL

## 2024-11-18 VITALS
WEIGHT: 141.2 LBS | BODY MASS INDEX: 20.22 KG/M2 | SYSTOLIC BLOOD PRESSURE: 98 MMHG | TEMPERATURE: 99.7 F | DIASTOLIC BLOOD PRESSURE: 60 MMHG | RESPIRATION RATE: 13 BRPM | HEIGHT: 70 IN | HEART RATE: 79 BPM | OXYGEN SATURATION: 99 %

## 2024-11-18 DIAGNOSIS — S39.012D BACK STRAIN, SUBSEQUENT ENCOUNTER: ICD-10-CM

## 2024-11-18 PROCEDURE — 3074F SYST BP LT 130 MM HG: CPT | Performed by: STUDENT IN AN ORGANIZED HEALTH CARE EDUCATION/TRAINING PROGRAM

## 2024-11-18 PROCEDURE — 99213 OFFICE O/P EST LOW 20 MIN: CPT | Performed by: STUDENT IN AN ORGANIZED HEALTH CARE EDUCATION/TRAINING PROGRAM

## 2024-11-18 PROCEDURE — 3078F DIAST BP <80 MM HG: CPT | Performed by: STUDENT IN AN ORGANIZED HEALTH CARE EDUCATION/TRAINING PROGRAM

## 2024-11-18 NOTE — LETTER
PHYSICIAN’S AND CHIROPRACTIC PHYSICIAN'S   PROGRESS REPORT   CERTIFICATION OF DISABILITY Claim Number:     Social Security Number:    Patient’s Name: PAUL HO Date of Injury: 11/7/2024   Employer: [employer] [facility] Name of MCO (if applicable):      Patient’s Job Description/Occupation: ONLINE  AND DELIVERY       Previous Injuries/Diseases/Surgeries Contributing to the Condition:  Overall patient states he is feeling better today.  Still having mild discomfort myofascial midline lower thoracic upper lumbar region and adjacent soft tissue.  Symptoms are aggravated with certain movements.  Ibuprofen helps.  No radiculopathy.  Has not done any home exercises.      Diagnosis: (S39.012D) Back strain, subsequent encounter      Related to the Industrial Injury? Yes     Explain:        Objective Medical Findings: Gen: no acute distress, normal voice  Skin: dry, intact, moist mucosal membranes  Head: Atraumatic, normocephalic  Psych: normal affect, normal judgement, alert, awake  Musculoskeletal: No erythema, ecchymosis or edema.  Full range of motion.  No focal midline or soft tissue tenderness to palpation.  No motor or sensory deficits.           None - Discharged                         Stable  No                 Ratable  No     X   Generally Improved                         Condition Worsened                  Condition Same  May Have Suffered a Permanent Disability No     Treatment Plan:    -25 pound work restriction while at work  -Provided a handout with home stretches and exercises  -Continue ibuprofen as needed  -Follow-up in 1 week  -If not ready to be discharged with likely refer patient to physical therapy and have him follow-up with occupational medicine         No Change in Therapy                  PT/OT Prescribed                      Medication May be Used While Working        Case Management                          PT/OT Discontinued    Consultation    Further Diagnostic  Studies:    Prescription(s)                 Released to FULL DUTY /No Restrictions on (Date):       Certified TOTALLY TEMPORARILY DISABLED (Indicate Dates) From:   To:    X  Released to RESTRICTED/Modified Duty on (Date): From: 11/18/2024 To: 11/25/2024  Restrictions Are:         No Sitting    No Standing    No Pulling Other:         No Bending at Waist     No Stooping     No Lifting        No Carrying     No Walking Lifting Restricted to (lbs.):  < or = to 25 pounds       No Pushing        No Climbing     No Reaching Above Shoulders       Date of Next Visit:  11/25/2024 Date of this Exam: 11/18/2024 Physician/Chiropractic Physician Name: Edilson Cota D.O. Physician/Chiropractic Physician Signature:  Glynn Ford DO MPH     [place]:  [address] [place] [address], [address] [place], [place] [address] - Telephone [phone] [place]:  [address] [place] [address], [address] [place], [place] [address] - Telephone [phone]    [email or web address]  D-39 (Rev. 10/24)

## 2024-11-18 NOTE — PROGRESS NOTES
"Subjective:     Ezio Ghotra is a 18 y.o. male who presents for Follow-Up ( [facility] DOI 11/07/24, Back. Patient states that he feels better. Patient states minimal pain that comes and goes. )    Overall patient states he is feeling better today.  Still having mild discomfort myofascial midline lower thoracic upper lumbar region and adjacent soft tissue.  Symptoms are aggravated with certain movements.  Ibuprofen helps.  No radiculopathy.  Has not done any home exercises.       PMH:   No pertinent past medical history to this problem  MEDS:  Medications were reviewed in EMR  ALLERGIES:  Allergies were reviewed in EMR  FH:   No pertinent family history to this problem       Objective:     BP 98/60   Pulse 79   Temp 37.6 °C (99.7 °F) (Temporal)   Resp 13   Ht 1.778 m (5' 10\")   Wt 64 kg (141 lb 3.2 oz)   SpO2 99%   BMI 20.26 kg/m²     Gen: no acute distress, normal voice  Skin: dry, intact, moist mucosal membranes  Head: Atraumatic, normocephalic  Psych: normal affect, normal judgement, alert, awake  Musculoskeletal: No erythema, ecchymosis or edema.  Full range of motion.  No focal midline or soft tissue tenderness to palpation.  No motor or sensory deficits.      Assessment/Plan:       1. Back strain, subsequent encounter      FROM   TO              -25 pound work restriction while at work  -Provided a handout with home stretches and exercises  -Continue ibuprofen as needed  -Follow-up in 1 week  -If not ready to be discharged with likely refer patient to physical therapy and have him follow-up with occupational medicine  "

## 2024-11-26 ENCOUNTER — OCCUPATIONAL MEDICINE (OUTPATIENT)
Dept: OCCUPATIONAL MEDICINE | Facility: CLINIC | Age: 19
End: 2024-11-26
Payer: COMMERCIAL

## 2024-11-26 VITALS
RESPIRATION RATE: 16 BRPM | SYSTOLIC BLOOD PRESSURE: 114 MMHG | TEMPERATURE: 98.7 F | WEIGHT: 139 LBS | OXYGEN SATURATION: 95 % | BODY MASS INDEX: 19.9 KG/M2 | DIASTOLIC BLOOD PRESSURE: 70 MMHG | HEART RATE: 70 BPM | HEIGHT: 70 IN

## 2024-11-26 DIAGNOSIS — S39.012D STRAIN OF BACK, SUBSEQUENT ENCOUNTER: ICD-10-CM

## 2024-11-26 ASSESSMENT — ENCOUNTER SYMPTOMS
NECK PAIN: 0
FOCAL WEAKNESS: 0
SENSORY CHANGE: 0

## 2024-11-26 NOTE — LETTER
PHYSICIAN’S AND CHIROPRACTIC PHYSICIAN'S   PROGRESS REPORT   CERTIFICATION OF DISABILITY Claim Number:     Social Security Number:    Patient’s Name: PAUL HO Date of Injury: 11/7/2024   Employer: [employer] [facility] Name of MCO (if applicable):      Patient’s Job Description/Occupation: ONLINE  AND DELIVERY       Previous Injuries/Diseases/Surgeries Contributing to the Condition:  N/A      Diagnosis: (S39.012D) Strain of back, subsequent encounter      Related to the Industrial Injury? Yes     Explain: Lifting and twisting heavy groceries      Objective Medical Findings: Back: no point tenderness. Full range of motion. Full strength.          None - Discharged                         Stable  No                 Ratable  No     X   Generally Improved                         Condition Worsened                  Condition Same  May Have Suffered a Permanent Disability No     Treatment Plan:    Trial full duty           No Change in Therapy                  PT/OT Prescribed                      Medication May be Used While Working        Case Management                          PT/OT Discontinued    Consultation    Further Diagnostic Studies:    Prescription(s)               X  Released to FULL DUTY /No Restrictions on (Date):  11/26/2024    Certified TOTALLY TEMPORARILY DISABLED (Indicate Dates) From:   To:      Released to RESTRICTED/Modified Duty on (Date): From:   To:    Restrictions Are:         No Sitting    No Standing    No Pulling Other:         No Bending at Waist     No Stooping     No Lifting        No Carrying     No Walking Lifting Restricted to (lbs.):          No Pushing        No Climbing     No Reaching Above Shoulders       Date of Next Visit:  12/9/2024 @ 10:30 AM Date of this Exam: 11/26/2024 Physician/Chiropractic Physician Name: Aaron Yin M.D. Physician/Chiropractic Physician Signature:  Glynn Ford DO MPH     [place]:  [address] [place] [address], [address]  [place], [place] [address] - Telephone [phone] [place]:  [address]  [facility], [address] [place], [place] [address] - Telephone [phone]    [email or web address]  D-39 (Rev. 10/24)

## 2024-11-26 NOTE — PROGRESS NOTES
"Subjective     Ezio Ghotra is a 18 y.o. male who presents with Follow-Up ([facility] DOI 11/7/24 back, rm 16)            DOI: 11/7/24  F/u visit back strain    ADDISON: Lifting and twisting heavy groceries    Symptoms have resolved.  He feels 100% improved and would like to try full duty.  No prior injury.  No second job or outside activity contributing.        Review of Systems   Musculoskeletal:  Negative for neck pain.   Skin:  Negative for rash.   Neurological:  Negative for sensory change and focal weakness.              Objective     /70   Pulse 70   Temp 37.1 °C (98.7 °F)   Resp 16   Ht 1.778 m (5' 10\")   Wt 63 kg (139 lb)   SpO2 95%   BMI 19.94 kg/m²      Physical Exam  Neurological:      General: No focal deficit present.      Gait: Gait normal.         Back: no point tenderness. Full range of motion. Full strength.                    Assessment & Plan   Urgent care visit 11/18/2024 reviewed  Emergency department visit 11/7/2024 reviewed     1. Strain of back, subsequent encounter          Differential diagnosis, natural history, supportive care, and indications for immediate follow-up were discussed.     Doing well.  Trial full duty.  "